# Patient Record
Sex: FEMALE | Race: BLACK OR AFRICAN AMERICAN | URBAN - METROPOLITAN AREA
[De-identification: names, ages, dates, MRNs, and addresses within clinical notes are randomized per-mention and may not be internally consistent; named-entity substitution may affect disease eponyms.]

---

## 2023-07-11 ENCOUNTER — OFFICE VISIT (OUTPATIENT)
Facility: LOCATION | Age: 68
End: 2023-07-11
Payer: MEDICARE

## 2023-07-11 DIAGNOSIS — H18.603: ICD-10-CM

## 2023-07-11 DIAGNOSIS — H40.1132 PRIMARY OPEN-ANGLE GLAUCOMA, `BILATERAL`, M`OD`ERATE STAGE: Primary | ICD-10-CM

## 2023-07-11 DIAGNOSIS — E11.3293 TYPE 2 DIABETES MELLITUS WITH MILD NONPROLIFERATIVE DIABETIC RETINOPATHY WITHO`OU`T MACULAR EDEMA, `BILATERAL`: ICD-10-CM

## 2023-07-11 PROCEDURE — 92133 CPTRZD OPH DX IMG PST SGM ON: CPT | Performed by: OPHTHALMOLOGY

## 2023-07-11 PROCEDURE — 92012 INTRM OPH EXAM EST PATIENT: CPT | Performed by: OPHTHALMOLOGY

## 2023-07-11 ASSESSMENT — INTRAOCULAR PRESSURE
OD: 13
OS: 13

## 2023-07-11 NOTE — IMPRESSION/PLAN
Impression: Keratoconus Unspecified  Bilateral.  '07/06/22 Dx Last Visit' Plan: Keep appointments with Dr Nilsa Raza at [facility]

## 2023-07-11 NOTE — IMPRESSION/PLAN
Impression: Primary open-angle glaucoma, `BILATERAL`, m`OD`erate stage. Plan: Primary Open Angle Glaucoma OU   . Recommend yearly OCT of the nerves, Visual Fields, and ON Photos. Careful observation of intraocular pressures, anatomical, and functional tests are recommended. Patient understands that permanent blindness can occur without adequate pressure control and observation. Compliance is strongly urged. The patient is to call back with any worsening of symptoms or concerns. 


Continue  [place] OU QHS

## 2023-07-11 NOTE — IMPRESSION/PLAN
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 Plan: Non-Proliferative diabetic retinopathy OU -  Discussed the pathophysiology of diabetes and its effect on the eye. Stressed the importance of strong glucose control. Reviewed retinal examinations with the patient.

## 2023-12-12 ENCOUNTER — OFFICE VISIT (OUTPATIENT)
Facility: LOCATION | Age: 68
End: 2023-12-12
Payer: MEDICARE

## 2023-12-12 DIAGNOSIS — E11.3293 TYPE 2 DIABETES MELLITUS WITH MILD NONPROLIFERATIVE DIABETIC RETINOPATHY WITHO`OU`T MACULAR EDEMA, `BILATERAL`: ICD-10-CM

## 2023-12-12 DIAGNOSIS — H25.13 AGE-RELATED NUCLEAR CATARACT, BILATERAL: ICD-10-CM

## 2023-12-12 DIAGNOSIS — H40.1132 PRIMARY OPEN-ANGLE GLAUCOMA, `BILATERAL`, M`OD`ERATE STAGE: Primary | ICD-10-CM

## 2023-12-12 DIAGNOSIS — H18.603: ICD-10-CM

## 2023-12-12 PROCEDURE — 92014 COMPRE OPH EXAM EST PT 1/>: CPT | Performed by: OPHTHALMOLOGY

## 2023-12-12 PROCEDURE — 92083 EXTENDED VISUAL FIELD XM: CPT | Performed by: OPHTHALMOLOGY

## 2023-12-12 ASSESSMENT — INTRAOCULAR PRESSURE
OD: 12
OS: 13

## 2024-06-12 ENCOUNTER — OFFICE VISIT (OUTPATIENT)
Facility: LOCATION | Age: 69
End: 2024-06-12
Payer: MEDICARE

## 2024-06-12 DIAGNOSIS — H18.603: ICD-10-CM

## 2024-06-12 DIAGNOSIS — E11.3293 TYPE 2 DIABETES MELLITUS WITH MILD NONPROLIFERATIVE DIABETIC RETINOPATHY WITHO`OU`T MACULAR EDEMA, `BILATERAL`: ICD-10-CM

## 2024-06-12 DIAGNOSIS — H25.13 AGE-RELATED NUCLEAR CATARACT, BILATERAL: ICD-10-CM

## 2024-06-12 DIAGNOSIS — H40.1132 PRIMARY OPEN-ANGLE GLAUCOMA, `BILATERAL`, M`OD`ERATE STAGE: Primary | ICD-10-CM

## 2024-06-12 PROCEDURE — 92012 INTRM OPH EXAM EST PATIENT: CPT | Performed by: OPHTHALMOLOGY

## 2024-06-12 RX ORDER — LATANOPROST 50 UG/ML
0.005 % SOLUTION OPHTHALMIC
Qty: 7.5 | Refills: 3 | Status: ACTIVE
Start: 2024-06-12

## 2024-06-12 ASSESSMENT — INTRAOCULAR PRESSURE
OD: 15
OS: 13

## 2024-11-14 ENCOUNTER — OFFICE VISIT (OUTPATIENT)
Facility: LOCATION | Age: 69
End: 2024-11-14
Payer: MEDICARE

## 2024-11-14 DIAGNOSIS — H25.13 AGE-RELATED NUCLEAR CATARACT, BILATERAL: ICD-10-CM

## 2024-11-14 DIAGNOSIS — H40.1132 PRIMARY OPEN-ANGLE GLAUCOMA, `BILATERAL`, M`OD`ERATE STAGE: Primary | ICD-10-CM

## 2024-11-14 DIAGNOSIS — H18.603: ICD-10-CM

## 2024-11-14 DIAGNOSIS — E11.3293 TYPE 2 DIABETES MELLITUS WITH MILD NONPROLIFERATIVE DIABETIC RETINOPATHY WITHO`OU`T MACULAR EDEMA, `BILATERAL`: ICD-10-CM

## 2024-11-14 PROCEDURE — 92012 INTRM OPH EXAM EST PATIENT: CPT | Performed by: OPHTHALMOLOGY

## 2024-11-14 PROCEDURE — 92083 EXTENDED VISUAL FIELD XM: CPT | Performed by: OPHTHALMOLOGY

## 2024-11-14 PROCEDURE — 92133 CPTRZD OPH DX IMG PST SGM ON: CPT | Performed by: OPHTHALMOLOGY

## 2024-11-14 RX ORDER — PREDNISOLONE ACETATE 10 MG/ML
1 % SUSPENSION/ DROPS OPHTHALMIC
Qty: 10 | Refills: 0 | Status: ACTIVE
Start: 2024-11-14

## 2024-11-14 RX ORDER — LATANOPROST 50 UG/ML
0.005 % SOLUTION OPHTHALMIC
Qty: 7.5 | Refills: 3 | Status: ACTIVE
Start: 2024-11-14

## 2024-11-14 ASSESSMENT — INTRAOCULAR PRESSURE
OD: 13
OS: 13

## 2024-12-09 ENCOUNTER — SURGERY (OUTPATIENT)
Facility: LOCATION | Age: 69
End: 2024-12-09
Payer: MEDICARE

## 2024-12-09 ENCOUNTER — LASER (OUTPATIENT)
Facility: LOCATION | Age: 69
End: 2024-12-09
Payer: MEDICARE

## 2024-12-09 DIAGNOSIS — H40.1112 PRIMARY OPEN-ANGLE GLAUCOMA, RIGHT EYE, MODERATE STAGE: Primary | ICD-10-CM

## 2024-12-09 PROCEDURE — 65855 TRABECULOPLASTY LASER SURG: CPT | Performed by: OPHTHALMOLOGY

## 2024-12-30 ENCOUNTER — SURGERY (OUTPATIENT)
Facility: LOCATION | Age: 69
End: 2024-12-30
Payer: MEDICARE

## 2024-12-30 ENCOUNTER — LASER (OUTPATIENT)
Facility: LOCATION | Age: 69
End: 2024-12-30
Payer: MEDICARE

## 2024-12-30 DIAGNOSIS — H40.1122 PRIMARY OPEN-ANGLE GLAUCOMA, LEFT EYE, MODERATE STAGE: Primary | ICD-10-CM

## 2024-12-30 PROCEDURE — 65855 TRABECULOPLASTY LASER SURG: CPT | Performed by: OPHTHALMOLOGY

## 2025-01-22 ENCOUNTER — OFFICE VISIT (OUTPATIENT)
Facility: LOCATION | Age: 70
End: 2025-01-22
Payer: MEDICARE

## 2025-01-22 DIAGNOSIS — E11.3293 TYPE 2 DIABETES MELLITUS WITH MILD NONPROLIFERATIVE DIABETIC RETINOPATHY WITHO`OU`T MACULAR EDEMA, `BILATERAL`: ICD-10-CM

## 2025-01-22 DIAGNOSIS — H25.813 COMBINED FORMS OF AGE-RELATED CATARACT, BILATERAL: ICD-10-CM

## 2025-01-22 DIAGNOSIS — H18.603: ICD-10-CM

## 2025-01-22 DIAGNOSIS — H40.1132 PRIMARY OPEN-ANGLE GLAUCOMA, `BILATERAL`, M`OD`ERATE STAGE: Primary | ICD-10-CM

## 2025-01-22 PROCEDURE — 92012 INTRM OPH EXAM EST PATIENT: CPT | Performed by: OPHTHALMOLOGY

## 2025-01-22 ASSESSMENT — INTRAOCULAR PRESSURE
OD: 13
OS: 13

## 2025-06-05 ENCOUNTER — POST-OPERATIVE VISIT (OUTPATIENT)
Facility: LOCATION | Age: 70
End: 2025-06-05
Payer: MEDICARE

## 2025-06-05 DIAGNOSIS — Z48.810 ENCOUNTER FOR SURGICAL AFTERCARE FOLLOWING SURGERY ON A SENSE ORGAN: Primary | ICD-10-CM

## 2025-06-05 PROCEDURE — 99024 POSTOP FOLLOW-UP VISIT: CPT | Performed by: OPHTHALMOLOGY

## 2025-06-05 ASSESSMENT — INTRAOCULAR PRESSURE: OD: 12

## 2025-06-10 ENCOUNTER — OFFICE VISIT (OUTPATIENT)
Facility: LOCATION | Age: 70
End: 2025-06-10
Payer: MEDICARE

## 2025-06-10 DIAGNOSIS — H40.1132 PRIMARY OPEN-ANGLE GLAUCOMA, `BILATERAL`, M`OD`ERATE STAGE: ICD-10-CM

## 2025-06-10 DIAGNOSIS — H25.812 COMBINED FORMS OF AGE-RELATED CATARACT, LEFT EYE: ICD-10-CM

## 2025-06-10 DIAGNOSIS — Z48.810 ENCOUNTER FOR SURGICAL AFTERCARE FOLLOWING SURGERY ON A SENSE ORGAN: Primary | ICD-10-CM

## 2025-06-10 DIAGNOSIS — H18.613 KERATOCONUS, STABLE, BILATERAL: ICD-10-CM

## 2025-06-10 DIAGNOSIS — H52.223 REGULAR ASTIGMATISM, BILATERAL: ICD-10-CM

## 2025-06-10 PROCEDURE — 92015 DETERMINE REFRACTIVE STATE: CPT | Performed by: OPHTHALMOLOGY

## 2025-06-10 PROCEDURE — 99024 POSTOP FOLLOW-UP VISIT: CPT | Performed by: OPHTHALMOLOGY

## 2025-06-10 ASSESSMENT — KERATOMETRY
OS: 43.75
OD: 52.50

## 2025-06-10 ASSESSMENT — VISUAL ACUITY
OS: 20/30
OD: 20/200

## 2025-06-10 ASSESSMENT — INTRAOCULAR PRESSURE
OD: 16
OS: 15

## 2025-06-25 ENCOUNTER — PROCEDURE (OUTPATIENT)
Facility: LOCATION | Age: 70
End: 2025-06-25
Payer: MEDICARE

## 2025-06-25 ENCOUNTER — SURGERY (OUTPATIENT)
Facility: LOCATION | Age: 70
End: 2025-06-25
Payer: MEDICARE

## 2025-06-25 DIAGNOSIS — H40.1122 PRIMARY OPEN-ANGLE GLAUCOMA, LEFT EYE, MODERATE STAGE: Primary | ICD-10-CM

## 2025-06-25 PROCEDURE — 0660T IMPLT ANT SGM IO NBIO RX SYS: CPT | Performed by: OPHTHALMOLOGY

## 2025-06-25 ASSESSMENT — INTRAOCULAR PRESSURE
OS: 10
OS: 16
OS: 10
OS: 16

## 2025-06-26 ENCOUNTER — POST-OPERATIVE VISIT (OUTPATIENT)
Facility: LOCATION | Age: 70
End: 2025-06-26
Payer: MEDICARE

## 2025-06-26 DIAGNOSIS — Z96.1 PRESENCE OF INTRAOCULAR LENS: Primary | ICD-10-CM

## 2025-06-26 PROCEDURE — 99024 POSTOP FOLLOW-UP VISIT: CPT | Performed by: OPHTHALMOLOGY

## 2025-06-26 ASSESSMENT — INTRAOCULAR PRESSURE
OS: 15
OD: 16

## 2025-07-10 ENCOUNTER — POST-OPERATIVE VISIT (OUTPATIENT)
Facility: LOCATION | Age: 70
End: 2025-07-10
Payer: MEDICARE

## 2025-07-10 DIAGNOSIS — E11.3293 TYPE 2 DIABETES MELLITUS WITH MILD NONPROLIFERATIVE DIABETIC RETINOPATHY WITHO`OU`T MACULAR EDEMA, `BILATERAL`: ICD-10-CM

## 2025-07-10 DIAGNOSIS — H18.603: ICD-10-CM

## 2025-07-10 DIAGNOSIS — H40.1132 PRIMARY OPEN-ANGLE GLAUCOMA, `BILATERAL`, M`OD`ERATE STAGE: Primary | ICD-10-CM

## 2025-07-10 PROCEDURE — 92012 INTRM OPH EXAM EST PATIENT: CPT | Performed by: OPHTHALMOLOGY

## 2025-07-10 ASSESSMENT — INTRAOCULAR PRESSURE
OS: 13
OD: 10